# Patient Record
Sex: FEMALE | Race: WHITE | NOT HISPANIC OR LATINO | Employment: FULL TIME | URBAN - METROPOLITAN AREA
[De-identification: names, ages, dates, MRNs, and addresses within clinical notes are randomized per-mention and may not be internally consistent; named-entity substitution may affect disease eponyms.]

---

## 2019-03-04 ENCOUNTER — APPOINTMENT (EMERGENCY)
Dept: RADIOLOGY | Facility: HOSPITAL | Age: 49
End: 2019-03-04
Payer: COMMERCIAL

## 2019-03-04 ENCOUNTER — HOSPITAL ENCOUNTER (EMERGENCY)
Facility: HOSPITAL | Age: 49
Discharge: HOME | End: 2019-03-04
Attending: EMERGENCY MEDICINE | Admitting: EMERGENCY MEDICINE
Payer: COMMERCIAL

## 2019-03-04 VITALS
WEIGHT: 210.32 LBS | DIASTOLIC BLOOD PRESSURE: 82 MMHG | OXYGEN SATURATION: 98 % | HEART RATE: 94 BPM | SYSTOLIC BLOOD PRESSURE: 141 MMHG | TEMPERATURE: 98.8 F | RESPIRATION RATE: 16 BRPM

## 2019-03-04 DIAGNOSIS — S46.912A STRAIN OF LEFT SHOULDER, INITIAL ENCOUNTER: ICD-10-CM

## 2019-03-04 DIAGNOSIS — S60.229A HAND CONTUSION: ICD-10-CM
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
## 2019-03-04 PROCEDURE — 73030 X-RAY EXAM OF SHOULDER: CPT

## 2019-03-04 PROCEDURE — 99284 EMERGENCY DEPT VISIT MOD MDM: CPT

## 2019-03-04 PROCEDURE — 73130 X-RAY EXAM OF HAND: CPT

## 2019-03-04 RX ORDER — LIDOCAINE 50 MG/G
1 PATCH TOPICAL ONCE
Status: DISCONTINUED | OUTPATIENT
Start: 2019-03-04 | End: 2019-03-04 | Stop reason: HOSPADM

## 2019-03-04 RX ORDER — IBUPROFEN 600 MG/1
600 TABLET ORAL EVERY 6 HOURS PRN
Qty: 30 TABLET | Refills: 0 | Status: SHIPPED | OUTPATIENT
Start: 2019-03-04 | End: 2019-03-14

## 2019-03-04 RX ORDER — METHOCARBAMOL 500 MG/1
500 TABLET, FILM COATED ORAL 4 TIMES DAILY
Qty: 40 TABLET | Refills: 0 | Status: SHIPPED | OUTPATIENT
Start: 2019-03-04 | End: 2019-03-14

## 2019-03-04 RX ORDER — LIDOCAINE 50 MG/G
1 PATCH TOPICAL DAILY
Qty: 30 PATCH | Refills: 0 | Status: SHIPPED | OUTPATIENT
Start: 2019-03-04

## 2019-03-04 RX ORDER — ACETAMINOPHEN 325 MG/1
975 TABLET ORAL ONCE AS NEEDED
Status: COMPLETED | OUTPATIENT
Start: 2019-03-04 | End: 2019-03-04

## 2019-03-04 RX ADMIN — LIDOCAINE 1 PATCH: 50 PATCH TOPICAL at 09:14

## 2019-03-04 RX ADMIN — ACETAMINOPHEN 975 MG: 325 TABLET, FILM COATED ORAL at 09:14

## 2019-03-04 NOTE — DISCHARGE INSTRUCTIONS
Rest and ice area  Ibuprofen as needed for pain  Ace wrap for support  FU with your doctor/sports med  Return to the ED for worsening symptoms
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
## 2020-10-22 ENCOUNTER — NURSE TRIAGE (OUTPATIENT)
Dept: OTHER | Facility: OTHER | Age: 50
End: 2020-10-22

## 2020-10-22 DIAGNOSIS — Z20.822 SUSPECTED SEVERE ACUTE RESPIRATORY SYNDROME CORONAVIRUS 2 (SARS-COV-2) INFECTION: Primary | ICD-10-CM

## 2020-10-23 DIAGNOSIS — Z20.822 SUSPECTED SEVERE ACUTE RESPIRATORY SYNDROME CORONAVIRUS 2 (SARS-COV-2) INFECTION: ICD-10-CM

## 2020-10-23 PROCEDURE — U0003 INFECTIOUS AGENT DETECTION BY NUCLEIC ACID (DNA OR RNA); SEVERE ACUTE RESPIRATORY SYNDROME CORONAVIRUS 2 (SARS-COV-2) (CORONAVIRUS DISEASE [COVID-19]), AMPLIFIED PROBE TECHNIQUE, MAKING USE OF HIGH THROUGHPUT TECHNOLOGIES AS DESCRIBED BY CMS-2020-01-R: HCPCS | Performed by: FAMILY MEDICINE

## 2020-10-24 LAB — SARS-COV-2 RNA SPEC QL NAA+PROBE: NOT DETECTED

## 2021-01-04 ENCOUNTER — TELEPHONE (OUTPATIENT)
Dept: OBGYN CLINIC | Facility: CLINIC | Age: 51
End: 2021-01-04

## 2021-01-05 ENCOUNTER — OFFICE VISIT (OUTPATIENT)
Dept: OBGYN CLINIC | Facility: CLINIC | Age: 51
End: 2021-01-05

## 2021-01-05 VITALS
DIASTOLIC BLOOD PRESSURE: 80 MMHG | SYSTOLIC BLOOD PRESSURE: 114 MMHG | BODY MASS INDEX: 30.51 KG/M2 | RESPIRATION RATE: 16 BRPM | HEIGHT: 69 IN | WEIGHT: 206 LBS | HEART RATE: 72 BPM

## 2021-01-05 DIAGNOSIS — R63.5 WEIGHT GAIN: Primary | ICD-10-CM

## 2021-01-05 DIAGNOSIS — R23.2 HOT FLASHES: ICD-10-CM

## 2021-01-05 PROCEDURE — 99203 OFFICE O/P NEW LOW 30 MIN: CPT | Performed by: FAMILY MEDICINE

## 2021-01-05 NOTE — PROGRESS NOTES
[address] [place] [address] Drive  1970       Assessment/Plan:  1  Weight gain  Pt states she has been putting on weight for over a year and is working hard to loose it with little success   - TSH + Free T4; Future  - Follicle stimulating hormone; Future  - CBC and differential; Future    2  Hot flashes  Admits to hot flashes, irritability  She recently started a very strict diet protocol but feels it may have started before this  - TSH + Free T4; Future  - Follicle stimulating hormone; Future       Follow up in 2 weeks      Subjective:    Avani Patel is a 44 y o  female who presents c/o wt gain, hot flashes, irritability, and loss of height that started over a year ago  Pt admits to irritable moods, increased hunger, hot flashes, and noted loss of height from 5'11 to 5'9  She denies any other s/s  She denies recent trauma  She has recently started a vigorous wt loss protocol with a strict diet and has lost about 10 lbs in the past 4 weeks  Pt has hx of depression and was taking Paxil about 18 months ago but weaned herself off after feeling she no longer needed it  She denies any fevers, chills, chest pain, sob, palpitations, swelling in legs/arms, night sweats, unwanted wt loss  No urinary complaints  She has a hx of hysterectomy in 2014 for uterine fibroids           Review of Systems  Constitutional: negative for chills, fevers and night sweats  Respiratory: negative for cough and wheezing  Cardiovascular: negative for chest pain and palpitations  Gastrointestinal: negative for abdominal pain, constipation, diarrhea and nausea  Genitourinary:negative for dysuria, hesitancy and urinary incontinence  Musculoskeletal:negative for arthralgias and muscle weakness  Neurological: negative for weakness  Endocrine: positive for temperature intolerance      Objective:  /80 (BP Location: Right arm, Patient Position: Sitting, Cuff Size: Large)   Pulse 72   Resp 16   Ht 5' 9" (1 753 m) Wt 93 4 kg (206 lb)   BMI 30 42 kg/m²      General:   alert and oriented, in no acute distress   Heart: regular rate and rhythm, S1, S2 normal, no murmur, click, rub or gallop   Lungs: clear to auscultation bilaterally   Abdomen: soft, non-tender, without masses or organomegaly   Neuro: DTR all 2+, Upper ext Strength 5/5, Lower ext Strength 5/5, sensation to light touch intact  CN II-XII intact             Irina Blanco DO PGY [address] [facility] [employer]

## 2021-01-19 ENCOUNTER — OFFICE VISIT (OUTPATIENT)
Dept: OBGYN CLINIC | Facility: CLINIC | Age: 51
End: 2021-01-19

## 2021-01-19 VITALS
HEART RATE: 92 BPM | HEIGHT: 69 IN | BODY MASS INDEX: 30.36 KG/M2 | SYSTOLIC BLOOD PRESSURE: 136 MMHG | DIASTOLIC BLOOD PRESSURE: 81 MMHG | WEIGHT: 205 LBS

## 2021-01-19 DIAGNOSIS — R63.4 WEIGHT LOSS: Primary | ICD-10-CM

## 2021-01-19 PROCEDURE — S0610 ANNUAL GYNECOLOGICAL EXAMINA: HCPCS | Performed by: FAMILY MEDICINE

## 2021-01-19 NOTE — PROGRESS NOTES
GYN Follow-Up Office Visit  Rosario Dash 44 y o  female   MRN: [id number] : 1970  ENCOUNTER: 2021 4:46 PM    Assessment and Plan   Weight loss  From our discussion, Ms Corado appears to suffer from a history of disordered eating, and it seems as though her visits to the Chiro-thin program took advantage of this fact and recommended a severe calorie restriction  Her weight loss plateau likely represents her body entering "famine mode," conserving weight by slowing metabolism  Her labs were normal   I recommended cessation of her current weight loss protocol and discussing her weight with a PCP  Local PCP information discussed  Annual exam to be done as scheduled    Chief Complaint     Chief Complaint   Patient presents with    Follow-up     blood work results        History of Present Illness   Rosario Dash is a 44y o -year-old female who presents today for followup of bloodwork  She recently had FSH, TSH, CBC checked, all of which were normal   This blood work was checked due to, as the patient explains, difficulty losing weight  She reports the following: In , she began using crack cocaine, leading her to lose quite a bit of weight  She successfully got off of crack cocaine and went to an eating disorder residential recovery clinic  After this, she gained weight back, reaching her peak of 210lb  One month ago started "Chiro-thin" weight loss program through a chiropractor in [place], [place], and was prescribed a 600cal diet  When she plateaued at 06CY weight loss, she was referred to our office to have her "hormones checked "  Last week she went on a trip to [facility], and noted that her back began to hurt  Upon return home, she went to a different chiropractor and was told that her back was hurting because she's malnourished, which sounds appropriate at a caloric intake of 600cal/day  Since, she has increased her calories to 1200/day  She feels less exhausted now      Review of Systems Review of Systems   Constitutional: Negative for activity change, chills, fatigue and fever  HENT: Negative for congestion, sinus pressure, sinus pain and sore throat  Respiratory: Negative for cough, shortness of breath and wheezing  Cardiovascular: Negative for chest pain, palpitations and leg swelling  Gastrointestinal: Negative for abdominal pain, diarrhea, nausea and vomiting  Genitourinary: Negative for decreased urine volume, dysuria, frequency and urgency  Musculoskeletal: Negative for arthralgias, myalgias, neck pain and neck stiffness  Skin: Negative for rash  Neurological: Negative for dizziness, light-headedness, numbness and headaches  Active Problem List     Patient Active Problem List   Diagnosis    Weight loss       Past Medical History, Past Surgical History, Family History, and Social History were reviewed and updated today as appropriate  Objective   /81 (BP Location: Left arm, Patient Position: Sitting, Cuff Size: Adult)   Pulse 92   Ht 5' 9" (1 753 m)   Wt 93 kg (205 lb)   BMI 30 27 kg/m²     Physical Exam  Vitals signs reviewed  Constitutional:       General: She is not in acute distress  Appearance: Normal appearance  She is not ill-appearing  Neurological:      Mental Status: She is alert         Diabetic Foot Exam    Pertinent Laboratory/Diagnostic Studies:  No results found for: GLUCOSE, BUN, CREATININE, CALCIUM, NA, K, CO2, CL  No results found for: ALT, AST, GGT, ALKPHOS, BILITOT    No results found for: WBC, HGB, HCT, MCV, PLT    No results found for: TSH    No results found for: CHOL  No results found for: TRIG  No results found for: HDL  No results found for: LDLCALC  No results found for: HGBA1C    Results for orders placed or performed in visit on 10/23/20   Novel Coronavirus (COVID-19), PCR [facility] - Collection at [facility]    Specimen: Nose; Nares   Result Value Ref Range    SARS-CoV-2  Not Detected Not Detected       No orders of the defined types were placed in this encounter  Current Medications     Current Outpatient Medications   Medication Sig Dispense Refill    ibuprofen (MOTRIN) 600 mg tablet Take 1 tablet (600 mg total) by mouth every 6 (six) hours as needed for mild pain for up to 10 days 30 tablet 0    lidocaine (LIDODERM) 5 % Apply 1 patch topically daily Remove & Discard patch within 12 hours or as directed by MD (Patient not taking: Reported on 1/5/2021) 30 patch 0    methocarbamol (ROBAXIN) 500 mg tablet Take 1 tablet (500 mg total) by mouth 4 (four) times a day for 10 days 40 tablet 0     No current facility-administered medications for this visit  ALLERGIES:  No Known Allergies    Health Maintenance     Health Maintenance   Topic Date Due    MAMMOGRAM  1970    Pneumococcal Vaccine: Pediatrics (0 to 5 Years) and At-Risk Patients (6 to 59 Years) (1 of 1 - PPSV23) 10/22/1974    Depression Screening PHQ  10/22/1980    HIV Screening  10/22/1983    BMI: Followup Plan  10/22/1986    Annual Physical  10/22/1986    DTaP,Tdap,and Td Vaccines (1 - Tdap) 10/22/1989    Cervical Cancer Screening  10/22/1989    Colorectal Cancer Screening  10/22/2018    Influenza Vaccine (1) 09/01/2020    BMI: Adult  01/19/2022    HIB Vaccine  Aged Out    Hepatitis B Vaccine  Aged Out    IPV Vaccine  Aged Out    Hepatitis A Vaccine  Aged Out    Meningococcal ACWY Vaccine  Aged Out    HPV Vaccine  Aged Out       There is no immunization history on file for this patient  Tra Xiao MD   [address]  1/19/2021  4:46 PM    Parts of this note were dictated using M*Cognia dictation software and may have sounds-like errors due to variation in pronunciation

## 2021-01-19 NOTE — ASSESSMENT & PLAN NOTE
From our discussion, Ms Corado appears to suffer from a history of disordered eating, and it seems as though her visits to the Chiro-thin program took advantage of this fact and recommended a severe calorie restriction  Her weight loss plateau likely represents her body entering "famine mode," conserving weight by slowing metabolism  Her labs were normal   I recommended cessation of her current weight loss protocol and discussing her weight with a PCP  Local PCP information discussed

## 2022-08-11 NOTE — PROGRESS NOTES
ANNUAL GYNECOLOGICAL EXAMINATION    Jocelyn Pimentel is a 46 y o  female who presents today for annual GYN exam    She reports no history of abnormal pap smears in her past  Her general medical history has been reviewed and she reports it as follows:    Supracervical Hysterectomy performed  due to pain with ovarian cysts    Visit Vitals  /88 (BP Location: Left arm, Patient Position: Sitting, Cuff Size: Adult)   Pulse 81   Ht 5' 10" (1 778 m)   Wt 88 5 kg (195 lb)   BMI 27 98 kg/m²   OB Status Hysterectomy   Smoking Status Current Some Day Smoker   BSA 2 07 m²       Past Medical History:   Diagnosis Date    Anxiety     Depression      Past Surgical History:   Procedure Laterality Date    HYSTERECTOMY       OB History        4    Para   3    Term   3            AB        Living   3       SAB        IAB        Ectopic        Multiple        Live Births   3               Social History     Tobacco Use    Smoking status: Current Some Day Smoker     Types: E-Cigarettes    Smokeless tobacco: Never Used    Tobacco comment: Vape   Vaping Use    Vaping Use: Some days    Substances: Nicotine, Flavoring   Substance Use Topics    Alcohol use: Yes     Comment: every day 2 at the most    Drug use: Never     Cancer-related family history is not on file      Current Outpatient Medications:     Multiple Vitamin (MULTIVITAMIN ADULT PO), Take 1 tablet by mouth daily, Disp: , Rfl:     FLUoxetine (PROzac) 40 MG capsule, Take 40 mg by mouth (Patient not taking: Reported on 2022), Disp: , Rfl:     ibuprofen (MOTRIN) 600 mg tablet, Take 1 tablet (600 mg total) by mouth every 6 (six) hours as needed for mild pain for up to 10 days, Disp: 30 tablet, Rfl: 0    lidocaine (LIDODERM) 5 %, Apply 1 patch topically daily Remove & Discard patch within 12 hours or as directed by MD (Patient not taking: Reported on 2021), Disp: 30 patch, Rfl: 0    methocarbamol (ROBAXIN) 500 mg tablet, Take 1 tablet (500 mg total) by mouth 4 (four) times a day for 10 days, Disp: 40 tablet, Rfl: 0    Review of Systems:  Review of Systems   Constitutional: Negative for diaphoresis and fever  Respiratory: Negative for shortness of breath  Cardiovascular: Negative for chest pain  Genitourinary: Negative for genital sores, pelvic pain, vaginal bleeding, vaginal discharge and vaginal pain  Physical Exam:  Physical Exam  Constitutional:       General: She is not in acute distress  HENT:      Head: Normocephalic  Nose: No rhinorrhea  Eyes:      General: No scleral icterus  Extraocular Movements: Extraocular movements intact  Cardiovascular:      Rate and Rhythm: Normal rate and regular rhythm  Heart sounds: Normal heart sounds  No murmur heard  Pulmonary:      Breath sounds: Normal breath sounds  No wheezing or rales  Chest:      Comments: Breasts supple, no masses, tenderness or nipple discharge  Abdominal:      Palpations: Abdomen is soft  Tenderness: There is no abdominal tenderness  Genitourinary:     General: Normal vulva  Vagina: No vaginal discharge  Comments: Cervix present, non tender  Musculoskeletal:      Right lower leg: No edema  Left lower leg: No edema  Skin:     General: Skin is warm and dry  Neurological:      General: No focal deficit present  Mental Status: She is alert  Point of Care Testing:   -Annual Screening Labs ordered per patient request     Assessment:     1  Normal well-woman GYN exam     Plan:   1  Pap smear done with HPV co-testing  Return to office for next annual or PRN

## 2022-08-12 ENCOUNTER — ANNUAL EXAM (OUTPATIENT)
Dept: OBGYN CLINIC | Facility: CLINIC | Age: 52
End: 2022-08-12

## 2022-08-12 VITALS
DIASTOLIC BLOOD PRESSURE: 88 MMHG | WEIGHT: 195 LBS | SYSTOLIC BLOOD PRESSURE: 133 MMHG | HEART RATE: 81 BPM | BODY MASS INDEX: 27.92 KG/M2 | HEIGHT: 70 IN

## 2022-08-12 DIAGNOSIS — Z13.9 SCREENING DUE: Primary | ICD-10-CM

## 2022-08-12 DIAGNOSIS — Z01.419 ENCOUNTER FOR ANNUAL ROUTINE GYNECOLOGICAL EXAMINATION: ICD-10-CM

## 2022-08-12 PROCEDURE — S0612 ANNUAL GYNECOLOGICAL EXAMINA: HCPCS | Performed by: FAMILY MEDICINE

## 2022-08-12 PROCEDURE — G0476 HPV COMBO ASSAY CA SCREEN: HCPCS

## 2022-08-12 PROCEDURE — G0145 SCR C/V CYTO,THINLAYER,RESCR: HCPCS | Performed by: CHIROPRACTOR

## 2022-08-12 RX ORDER — FLUOXETINE HYDROCHLORIDE 40 MG/1
40 CAPSULE ORAL
COMMUNITY

## 2022-08-15 LAB
HPV HR 12 DNA CVX QL NAA+PROBE: NEGATIVE
HPV16 DNA CVX QL NAA+PROBE: NEGATIVE
HPV18 DNA CVX QL NAA+PROBE: NEGATIVE

## 2022-08-17 LAB
LAB AP GYN PRIMARY INTERPRETATION: NORMAL
Lab: NORMAL

## 2022-10-18 ENCOUNTER — TELEPHONE (OUTPATIENT)
Dept: DERMATOLOGY | Age: 52
End: 2022-10-18

## 2022-10-18 NOTE — TELEPHONE ENCOUNTER
Returned call to NP from , stated she didn't receive return call and was seeking immediate apt, therefore, contacted another provider

## 2022-11-08 ENCOUNTER — APPOINTMENT (OUTPATIENT)
Dept: LAB | Facility: CLINIC | Age: 52
End: 2022-11-08

## 2022-11-08 DIAGNOSIS — Z13.9 SCREENING DUE: ICD-10-CM

## 2022-11-08 LAB
ALBUMIN SERPL BCP-MCNC: 3.6 G/DL (ref 3.5–5)
ALP SERPL-CCNC: 83 U/L (ref 46–116)
ALT SERPL W P-5'-P-CCNC: 29 U/L (ref 12–78)
ANION GAP SERPL CALCULATED.3IONS-SCNC: 5 MMOL/L (ref 4–13)
AST SERPL W P-5'-P-CCNC: 17 U/L (ref 5–45)
BASOPHILS # BLD AUTO: 0.05 THOUSANDS/ÂΜL (ref 0–0.1)
BASOPHILS NFR BLD AUTO: 1 % (ref 0–1)
BILIRUB SERPL-MCNC: 0.67 MG/DL (ref 0.2–1)
BUN SERPL-MCNC: 16 MG/DL (ref 5–25)
CALCIUM SERPL-MCNC: 9 MG/DL (ref 8.3–10.1)
CHLORIDE SERPL-SCNC: 105 MMOL/L (ref 96–108)
CHOLEST SERPL-MCNC: 281 MG/DL
CO2 SERPL-SCNC: 28 MMOL/L (ref 21–32)
CREAT SERPL-MCNC: 1.2 MG/DL (ref 0.6–1.3)
EOSINOPHIL # BLD AUTO: 0.13 THOUSAND/ÂΜL (ref 0–0.61)
EOSINOPHIL NFR BLD AUTO: 2 % (ref 0–6)
ERYTHROCYTE [DISTWIDTH] IN BLOOD BY AUTOMATED COUNT: 12.7 % (ref 11.6–15.1)
EST. AVERAGE GLUCOSE BLD GHB EST-MCNC: 114 MG/DL
GFR SERPL CREATININE-BSD FRML MDRD: 51 ML/MIN/1.73SQ M
GLUCOSE P FAST SERPL-MCNC: 97 MG/DL (ref 65–99)
HBA1C MFR BLD: 5.6 %
HCT VFR BLD AUTO: 43.9 % (ref 34.8–46.1)
HDLC SERPL-MCNC: 68 MG/DL
HGB BLD-MCNC: 13.8 G/DL (ref 11.5–15.4)
IMM GRANULOCYTES # BLD AUTO: 0.03 THOUSAND/UL (ref 0–0.2)
IMM GRANULOCYTES NFR BLD AUTO: 1 % (ref 0–2)
LDLC SERPL CALC-MCNC: 180 MG/DL (ref 0–100)
LYMPHOCYTES # BLD AUTO: 2.22 THOUSANDS/ÂΜL (ref 0.6–4.47)
LYMPHOCYTES NFR BLD AUTO: 34 % (ref 14–44)
MCH RBC QN AUTO: 31.3 PG (ref 26.8–34.3)
MCHC RBC AUTO-ENTMCNC: 31.4 G/DL (ref 31.4–37.4)
MCV RBC AUTO: 100 FL (ref 82–98)
MONOCYTES # BLD AUTO: 0.7 THOUSAND/ÂΜL (ref 0.17–1.22)
MONOCYTES NFR BLD AUTO: 11 % (ref 4–12)
NEUTROPHILS # BLD AUTO: 3.47 THOUSANDS/ÂΜL (ref 1.85–7.62)
NEUTS SEG NFR BLD AUTO: 51 % (ref 43–75)
NONHDLC SERPL-MCNC: 213 MG/DL
NRBC BLD AUTO-RTO: 0 /100 WBCS
PLATELET # BLD AUTO: 245 THOUSANDS/UL (ref 149–390)
PMV BLD AUTO: 11 FL (ref 8.9–12.7)
POTASSIUM SERPL-SCNC: 4.3 MMOL/L (ref 3.5–5.3)
PROT SERPL-MCNC: 7.2 G/DL (ref 6.4–8.4)
RBC # BLD AUTO: 4.41 MILLION/UL (ref 3.81–5.12)
SODIUM SERPL-SCNC: 138 MMOL/L (ref 135–147)
TRIGL SERPL-MCNC: 164 MG/DL
WBC # BLD AUTO: 6.6 THOUSAND/UL (ref 4.31–10.16)

## 2023-01-05 ENCOUNTER — OFFICE VISIT (OUTPATIENT)
Dept: URGENT CARE | Facility: CLINIC | Age: 53
End: 2023-01-05

## 2023-01-05 VITALS
HEART RATE: 91 BPM | WEIGHT: 195 LBS | BODY MASS INDEX: 27.3 KG/M2 | HEIGHT: 71 IN | RESPIRATION RATE: 16 BRPM | OXYGEN SATURATION: 98 % | TEMPERATURE: 97.5 F

## 2023-01-05 DIAGNOSIS — H93.12 TINNITUS OF LEFT EAR: Primary | ICD-10-CM

## 2023-01-05 RX ORDER — VALACYCLOVIR HYDROCHLORIDE 1 G/1
1000 TABLET, FILM COATED ORAL 2 TIMES DAILY
COMMUNITY
Start: 2023-01-03 | End: 2023-01-17

## 2023-01-05 NOTE — PROGRESS NOTES
[facility]        NAME: Manjula Leiva is a 45 y o  female  : 1970    MRN: [id number]  DATE: 2023  TIME: 4:27 PM    Assessment and Plan   Tinnitus of left ear [H93 12]  1  Tinnitus of left ear        Pt presents with concerns of ringing in the left ear and drainage on earpods  On examination left ear appears normal  Recommend monitor ringing and follow-up with PCP  Instructed to use Flonase starting 1-3 days prior to travel and continue until 1-3 days after return to help reduce pressure and promote draining of the Eustachian tubes and middle ear  Patient Instructions   Patient Instructions   Flonase when traveling  Follow-up with PCP if tinnutis persists  Report to the ED if you develop any associated fever, lightheadedness, or dizziness  Follow up with PCP in 3-5 days  Proceed to  ER if symptoms worsen  Chief Complaint     Chief Complaint   Patient presents with   • Earache     Left ear is whistling- she reports she noticed drainage on her left ear pod  History of Present Illness       45year old female presents with high pitched ringing/whistling in her ear that began 2 hours ago  She has had one other instance of this that resolved  She notes her left earpod had a lot of redish orange drainage on it  Pt reports URI last week and reports she has travel plans and is leaving on   She wanted to get checked and make sure there was nothing wrong that might get worse with flying  Earache   There is pain in the left ear  This is a new problem  The current episode started today  The problem occurs constantly  The problem has been unchanged  There has been no fever  Associated symptoms include ear discharge  Pertinent negatives include no headaches  Review of Systems   Review of Systems   HENT: Positive for ear discharge, ear pain and tinnitus  Neurological: Negative for dizziness, light-headedness and headaches           Current Medications Current Outpatient Medications:   •  FLUoxetine (PROzac) 40 MG capsule, Take 40 mg by mouth, Disp: , Rfl:   •  lidocaine (LIDODERM) 5 %, Apply 1 patch topically daily Remove & Discard patch within 12 hours or as directed by MD, Disp: 30 patch, Rfl: 0  •  Multiple Vitamin (MULTIVITAMIN ADULT PO), Take 1 tablet by mouth daily, Disp: , Rfl:   •  valACYclovir (VALTREX) 1,000 mg tablet, Take 1,000 mg by mouth 2 (two) times a day, Disp: , Rfl:   •  ibuprofen (MOTRIN) 600 mg tablet, Take 1 tablet (600 mg total) by mouth every 6 (six) hours as needed for mild pain for up to 10 days, Disp: 30 tablet, Rfl: 0  •  methocarbamol (ROBAXIN) 500 mg tablet, Take 1 tablet (500 mg total) by mouth 4 (four) times a day for 10 days, Disp: 40 tablet, Rfl: 0    Current Allergies     Allergies as of 01/05/2023   • (No Known Allergies)            The following portions of the patient's history were reviewed and updated as appropriate: allergies, current medications, past family history, past medical history, past social history, past surgical history and problem list      Past Medical History:   Diagnosis Date   • Anxiety    • Depression        Past Surgical History:   Procedure Laterality Date   • HYSTERECTOMY         Family History   Problem Relation Age of Onset   • No Known Problems Mother    • COPD Father          Medications have been verified  Objective   Pulse 91   Temp 97 5 °F (36 4 °C)   Resp 16   Ht 5' 11" (1 803 m)   Wt 88 5 kg (195 lb)   SpO2 98%   BMI 27 20 kg/m²   No LMP recorded  Patient has had a hysterectomy  Physical Exam     Physical Exam  Vitals and nursing note reviewed  Constitutional:       General: She is awake  She is not in acute distress  Appearance: Normal appearance  She is well-developed and well-groomed  She is not ill-appearing, toxic-appearing or diaphoretic  HENT:      Head: Normocephalic and atraumatic        Right Ear: Hearing, tympanic membrane, ear canal and external ear normal       Left Ear: Hearing, tympanic membrane, ear canal and external ear normal    Eyes:      General: Lids are normal  Vision grossly intact  Gaze aligned appropriately  Cardiovascular:      Rate and Rhythm: Normal rate  Pulmonary:      Effort: Pulmonary effort is normal       Comments: Patient is speaking in full sentences with no increased respiratory effort  No audible wheezing or stridor  Musculoskeletal:      Cervical back: Normal range of motion  Skin:     General: Skin is warm and dry  Neurological:      Mental Status: She is alert and oriented to person, place, and time  Coordination: Coordination is intact  Gait: Gait is intact  Psychiatric:         Attention and Perception: Attention and perception normal          Mood and Affect: Mood and affect normal          Speech: Speech normal          Behavior: Behavior normal  Behavior is cooperative  Note: Portions of this record may have been created with voice recognition software  Occasional wrong word or "sound a like" substitutions may have occurred due to the inherent limitations of voice recognition software  Please read the chart carefully and recognize, using context, where substitutions have occurred  *

## 2023-01-05 NOTE — PATIENT INSTRUCTIONS
Flonase when traveling  Follow-up with PCP if tinnutis persists  Report to the ED if you develop any associated fever, lightheadedness, or dizziness 
English

## 2023-08-07 ENCOUNTER — TELEPHONE (OUTPATIENT)
Age: 53
End: 2023-08-07

## 2023-08-07 NOTE — TELEPHONE ENCOUNTER
[place] [facility] [place]    [place]/Office: Heidi Bob    #: [phone]    Escalation: Dylon Esposito thought she was seen by one of our Podiatrist years ago. I went into her chart to see and she was not. She did schedule as a new patient.